# Patient Record
Sex: MALE | Race: BLACK OR AFRICAN AMERICAN | NOT HISPANIC OR LATINO | Employment: OTHER | URBAN - METROPOLITAN AREA
[De-identification: names, ages, dates, MRNs, and addresses within clinical notes are randomized per-mention and may not be internally consistent; named-entity substitution may affect disease eponyms.]

---

## 2017-01-23 ENCOUNTER — LAB VISIT (OUTPATIENT)
Dept: LAB | Facility: HOSPITAL | Age: 47
End: 2017-01-23
Payer: MEDICARE

## 2017-01-23 DIAGNOSIS — Z76.82 ORGAN TRANSPLANT CANDIDATE: ICD-10-CM

## 2017-01-23 PROCEDURE — 86832 HLA CLASS I HIGH DEFIN QUAL: CPT | Mod: PO

## 2017-01-23 PROCEDURE — 86829 HLA CLASS I/II ANTIBODY QUAL: CPT | Mod: 91,PO

## 2017-01-23 PROCEDURE — 86829 HLA CLASS I/II ANTIBODY QUAL: CPT | Mod: PO

## 2017-01-31 LAB — HPRA INTERPRETATION: NORMAL

## 2017-02-06 DIAGNOSIS — Z76.82 ORGAN TRANSPLANT CANDIDATE: Primary | ICD-10-CM

## 2017-02-06 NOTE — LETTER
Date: 2017    To:  Dialysis Social Worker From:   Coby Salazar, [facility]    Madai Kearns, [facility] Bullock, [facility]    Mary Guzman, [facility] Tomas [facility]         Thank you in advance for collaborating with us.  If you have any questions, please call us at [phone].  Your patient, Juan Antonio Gandhi DOB 1970, is currently listed for transplant and is in need of an updated adherence assessment to maintain their listing for transplant.    Please provide the following information by completing and faxing this form back to us at [phone].    In the last 3 months:      Number of AMAs with dates: _________________________________________________________________     _________________________________________________________________________________________       Number of No-Show appointments with dates: ___________________________________________________      _________________________________________________________________________________________    Last intact PTH:  ____       Any concerns with Labs:  yes or no              If yes, please explain:  _______________________________________________________________________     _________________________________________________________________________________________     Any concerns with Caregivers:  yes or no    If yes, please explain:  _______________________________________________________________________    _________________________________________________________________________________________     Any concerns with Transportations:  yes or no    If yes, please explain:  _______________________________________________________________________    _________________________________________________________________________________________    Any psychiatric and/or psychosocial concerns:  yes or no    If yes, please explain:  _______________________________________________________________________           __________________________________________________________________________________________       [address]  ?  [place] [address]  ?  phone [phone]  ?  fax [phone]  ?  [email or web address]    Confidentiality notice: The accompanying facsimile is intended solely for the use of the recipient designated above. Document(s) transmitted herewith may contain information that is confidential and privileged. Delivery, distribution or dissemination of this communication other than to the intended recipient is strictly prohibited. If you have received this facsimile in error, please notify us immediately by telephone.

## 2017-02-07 NOTE — PROGRESS NOTES
YEARLY LIST MANAGEMENT NOTE    Juan Antonio Gandhi's kidney transplant listing status reviewed.  Patient is due for follow-up appointments in March 2017.  Appointments will be scheduled per protocol.  HLA sample is current and being rec'd on a regular basis.

## 2017-02-08 LAB
CLASS I ANTIBODY COMMENTS - LUMINEX: NORMAL
CPRA %: 0
SERUM COLLECTION DT - LUMINEX CLASS I: NORMAL
SPCL1 TESTING DATE: NORMAL

## 2017-02-16 ENCOUNTER — TELEPHONE (OUTPATIENT)
Dept: TRANSPLANT | Facility: CLINIC | Age: 47
End: 2017-02-16

## 2017-02-16 NOTE — TELEPHONE ENCOUNTER
SW received adherence fax from pt's dialysis unit.  According to dialysis unit medical record, in the last three months pt has, 0 AMAs, 0  No Shows and denies issues with transportation and labs. Pt's PTH is 296.      Confirmed by dialysis unit via fax 2/6/2017

## 2017-02-17 ENCOUNTER — LAB VISIT (OUTPATIENT)
Dept: LAB | Facility: HOSPITAL | Age: 47
End: 2017-02-17
Payer: MEDICARE

## 2017-02-17 DIAGNOSIS — Z76.82 ORGAN TRANSPLANT CANDIDATE: ICD-10-CM

## 2017-02-17 PROCEDURE — 86829 HLA CLASS I/II ANTIBODY QUAL: CPT | Mod: 91,PO

## 2017-02-17 PROCEDURE — 86829 HLA CLASS I/II ANTIBODY QUAL: CPT | Mod: PO

## 2017-03-03 LAB — HPRA INTERPRETATION: NORMAL

## 2017-05-11 ENCOUNTER — HOSPITAL ENCOUNTER (OUTPATIENT)
Dept: RADIOLOGY | Facility: HOSPITAL | Age: 47
Discharge: HOME | End: 2017-05-11
Attending: INTERNAL MEDICINE | Admitting: INTERNAL MEDICINE
Payer: MEDICARE

## 2017-05-11 ENCOUNTER — OFFICE VISIT (OUTPATIENT)
Dept: TRANSPLANT | Facility: CLINIC | Age: 47
End: 2017-05-11
Payer: MEDICARE

## 2017-05-11 ENCOUNTER — HOSPITAL ENCOUNTER (OUTPATIENT)
Dept: CARDIOLOGY | Facility: CLINIC | Age: 47
Discharge: HOME | End: 2017-05-11
Payer: MEDICARE

## 2017-05-11 ENCOUNTER — DOCUMENTATION ONLY (OUTPATIENT)
Dept: CARDIOLOGY | Facility: CLINIC | Age: 47
End: 2017-05-11

## 2017-05-11 ENCOUNTER — HOSPITAL ENCOUNTER (OUTPATIENT)
Dept: RADIOLOGY | Facility: HOSPITAL | Age: 47
Discharge: HOME | End: 2017-05-11
Attending: INTERNAL MEDICINE
Payer: MEDICARE

## 2017-05-11 VITALS
TEMPERATURE: 98 F | RESPIRATION RATE: 16 BRPM | BODY MASS INDEX: 29.51 KG/M2 | OXYGEN SATURATION: 100 % | SYSTOLIC BLOOD PRESSURE: 138 MMHG | WEIGHT: 194.69 LBS | HEART RATE: 55 BPM | HEIGHT: 68 IN | DIASTOLIC BLOOD PRESSURE: 71 MMHG

## 2017-05-11 DIAGNOSIS — N18.9 ANEMIA IN CHRONIC KIDNEY DISEASE (CKD): ICD-10-CM

## 2017-05-11 DIAGNOSIS — Z76.82 ORGAN TRANSPLANT CANDIDATE: ICD-10-CM

## 2017-05-11 DIAGNOSIS — N25.81 HYPERPARATHYROIDISM, SECONDARY RENAL: ICD-10-CM

## 2017-05-11 DIAGNOSIS — D63.1 ANEMIA IN CHRONIC KIDNEY DISEASE (CKD): ICD-10-CM

## 2017-05-11 DIAGNOSIS — N18.6 ESRD (END STAGE RENAL DISEASE) ON DIALYSIS: ICD-10-CM

## 2017-05-11 DIAGNOSIS — I36.9 NONRHEUMATIC TRICUSPID VALVE DISORDER: ICD-10-CM

## 2017-05-11 DIAGNOSIS — Z99.2 ESRD (END STAGE RENAL DISEASE) ON DIALYSIS: ICD-10-CM

## 2017-05-11 DIAGNOSIS — Z76.82 ORGAN TRANSPLANT CANDIDATE: Primary | ICD-10-CM

## 2017-05-11 LAB
DIASTOLIC DYSFUNCTION: NO
ESTIMATED PA SYSTOLIC PRESSURE: 27.4
RETIRED EF AND QEF - SEE NOTES: 60 (ref 55–65)
TRICUSPID VALVE REGURGITATION: NORMAL

## 2017-05-11 PROCEDURE — 93351 STRESS TTE COMPLETE: CPT | Mod: 26,S$PBB,TXP, | Performed by: INTERNAL MEDICINE

## 2017-05-11 PROCEDURE — 93325 DOPPLER ECHO COLOR FLOW MAPG: CPT | Mod: PBBFAC,TXP | Performed by: INTERNAL MEDICINE

## 2017-05-11 PROCEDURE — 76770 US EXAM ABDO BACK WALL COMP: CPT | Mod: 26,TXP,, | Performed by: RADIOLOGY

## 2017-05-11 PROCEDURE — 99213 OFFICE O/P EST LOW 20 MIN: CPT | Mod: PBBFAC,25,TXP | Performed by: INTERNAL MEDICINE

## 2017-05-11 PROCEDURE — 99999 PR PBB SHADOW E&M-EST. PATIENT-LVL III: CPT | Mod: PBBFAC,TXP,, | Performed by: INTERNAL MEDICINE

## 2017-05-11 PROCEDURE — 99214 OFFICE O/P EST MOD 30 MIN: CPT | Mod: S$PBB,TXP,, | Performed by: INTERNAL MEDICINE

## 2017-05-11 PROCEDURE — 71020 XR CHEST PA AND LATERAL: CPT | Mod: 26,TXP,, | Performed by: RADIOLOGY

## 2017-05-11 PROCEDURE — 93320 DOPPLER ECHO COMPLETE: CPT | Mod: 26,S$PBB,TXP, | Performed by: INTERNAL MEDICINE

## 2017-05-11 RX ORDER — LISINOPRIL 20 MG/1
10 TABLET ORAL DAILY
COMMUNITY
Start: 2017-02-15 | End: 2018-04-17

## 2017-05-11 NOTE — PROGRESS NOTES
KIDNEY, KIDNEY/PANCREAS, PANCREAS TRANSPLANT RECIPIENT REEVALUATION    Requesting Physician: No ref. provider found    SUBJECTIVE     CC:   Six monthly/Annual reassessment of kidney transplant candidacy.    HPI:  Mr. Gandhi is a 46 y.o. year old Black or  male with ESRD secondary to HTN. He has been on the wait list for a kidney transplant at [facility] since 3/5/2014. Patient is currently on hemodialysis started on Mar 2014. Patient is dialyzing on MWF schedule for 4 hours. His dry weigh 87.5 kg.  Patient reports that he is tolerating dialysis well. He has a LUE AV fistula.  He makes some urine 3 x daily.  Patient denies any recent ER visit, hospitalization or recent history of coronary artery disease, stroke, seizure disorder, chronic obstructive pulmonary disease, liver disease, kidney stones, gallstones, deep venous thrombosis, pulmonary embolism, or malignancies. Patient states that he is doing well. he denies any CP, SOB,  nausea, vomiting, diarrhea, abdominal pain, melena, cough, fever, chills, weight loss or night sweats.  he has no focal weakness, sensory loss, numbness or paresthesias.     Functional Capacity Status: he is active, walks frequently and denies any chest pain, shortness of breath or lower extremity pain on mild to moderate activity.  Previous Transplant: no  Previous Blood Transfusion: no  Potential Donor: no  Anticoagulation: no      Recent Work UP  CXR: clear  Echo: unremarkable  Cardiac NM: unremarkable  Kindney US: unremarkable  PSA: 1.3 stable  Colonoscopy: N/A      Past Medical History:   Diagnosis Date    Allergy     Benign hypertension with ESRD (end-stage renal disease) 11/4/2014    Blood type A+ 11/4/2014    ESRD (end stage renal disease) on dialysis - 03/05/2014 11/4/2014    Hyperparathyroidism, secondary renal 11/4/2014    Organ transplant candidate 11/4/2014       Past Surgical History:  Past Surgical History:   Procedure Laterality Date    AV FISTULA PLACEMENT       MYLES    RENAL BIOPSY           Family History:  Family History   Problem Relation Age of Onset    Hypertension Sister      couple of sisters with HTN    Stroke Maternal Uncle     Heart disease Maternal Uncle     Kidney disease Neg Hx     Diabetes Neg Hx        Social History:  Social History     Social History    Marital status: Single     Spouse name: N/A    Number of children: 1    Years of education: N/A     Occupational History     Disabled     Social History Main Topics    Smoking status: Never Smoker    Smokeless tobacco: Never Used    Alcohol use No    Drug use: No    Sexual activity: Not on file     Other Topics Concern    Not on file     Social History Narrative    Disabled            No blood transfusions    No donors           Current Medication  Current Outpatient Prescriptions   Medication Sig Dispense Refill    calcium acetate (PHOSLO) 667 mg capsule Take 667 mg by mouth 3 (three) times daily with meals.      fluticasone (FLONASE) 50 mcg/actuation nasal spray 1 spray by Each Nare route once daily.      lisinopril (PRINIVIL,ZESTRIL) 20 MG tablet Take 20 mg by mouth.      montelukast (SINGULAIR) 5 MG chewable tablet Take 5 mg by mouth Daily.      sevelamer carbonate (RENVELA) 800 mg Tab Take 2,400 mg by mouth 3 (three) times daily with meals.       No current facility-administered medications for this visit.          Review of Systems  Constitutional: Negative for unexpected weight change.  + fatigue,   Eyes: Negative. Negative for visual disturbance.   Respiratory: Negative for shortness of breath.   Cardiovascular: Negative for chest pain.   Gastrointestinal: Negative for abdominal pain.   Genitourinary: Negative for difficulty urinating.   Musculoskeletal: Negative for arthralgias and gait problem.   Skin: Negative for rash.   Hematological: Does not bruise/bleed easily.     OBJECTIVE       Body mass index is 29.6 kg/(m^2).    Vitals:    05/11/17 1245    BP: 138/71   Pulse: (!) 55   Resp: 16   Temp: 98 °F (36.7 °C)       Physical Exam     General: No acute distress, well groomed  HEENT: Normocephalic, atraumatic, PERRLA, moist mucous membranes, no oral ulcerations/lesions   Neck: Supple, symmetrical, trachea midline, no masses, thyroid is normal without nodules or enlargement   Respiratory: Clear to auscultation bilaterally, respirations unlabored, no rales  Cardiovacular: Regular rate and rhythm, S1, S2 normal, no murmurs, no rubs or gallops, no carotid bruits, no JVD,   Gastrointestinal: Soft, non-tender, bowel sounds normal  Extremities: No clubbing or cyanosis of upper extremities bilaterally, no pedal edema bilaterally  Skin: warm and dry; no rash on exposed skin  Lymph nodes: Cervical and supraclavicular nodes normal   Neurologic: No focal neurologic deficits. alert and oriented x 3   Musculoskeletal: moves all extremities without difficulty, FROM, 5/5 strength, ambulates without an assistive device  Psychiatric: Normal mood and affect. Responds appropriately to questions.    Labs:  Reviewed with the patient      ASSESSMENT     1. Organ transplant candidate     2. Hyperparathyroidism, secondary renal     3. Anemia in chronic kidney disease (CKD)     4. ESRD (end stage renal disease) on dialysis - 03/05/2014           PLAN       Transplant Candidacy:    Mr. Gandhi is a a suitable for kidney transplantation. He remains in overall stable health, and will remain active on the transplant list.     Follow up:  In addition to the tests mentioned above, the patient will continue to have reevaluation as per the standing pre-kidney transplant protocol:   1. Monthly blood for PRA   2. Annual return to Clinic, except HIV Positive, > 65 years of age, or pancreas transplant candidates who will be scheduled to see transplant every 6 months while in pre-transpalnt phase.   3. Annual re-testing: CXR, EKG, mammograms for women over 40 and PSA for males over 40. cardiology  follow-up as recommended by initial cardiology pre-transplant evaluation.   4. Renal Ultrasound every 2 years.   5. Baseline colonoscopy after age 50 and repeated as recommended.         Counselling:  We discussed various aspects of kidney transplantation including transplant surgery, immunosuppressive medications and the need for close follow up. We also discussed side effects of immunosuppression including weight gain, hypertension, hyperlipidemia, new-onset diabetes after transplantation, infections and malignancies, especially skin cancers and lymphomas. I also reviewed the risk of acute rejection, vascular thrombosis, recurrent disease and potential transmission of infections such as hepatitis and HIV. I informed the patient that the average time on the wait list in the [facility] is between 3 to 5 years.

## 2017-05-11 NOTE — MR AVS SNAPSHOT
[facility]  [address]  [facility] [address]  Phone: [phone]                  Juan Antonio Gandhi   2017 12:30 PM   Office Visit    Description:  Male : 1970   Provider:  Nelia Manzanares MD   Department:  [facility]           Diagnoses this Visit        Comments    Organ transplant candidate    -  Primary     Hyperparathyroidism, secondary renal         Anemia in chronic kidney disease (CKD)         ESRD (end stage renal disease) on dialysis                To Do List           Goals (5 Years of Data)     None      [facility]     [facility] On Call Nurse Care Line -  Assistance  Unless otherwise directed by your provider, please contact [facility], our nurse care line that is available for  assistance.     Registered nurses in the [facility] provide: appointment scheduling, clinical advisement, health education, and other advisory services.  Call: [phone] (toll free)               Medications           Message regarding Medications     Verify the changes and/or additions to your medication regime listed below are the same as discussed with your clinician today.  If any of these changes or additions are incorrect, please notify your healthcare provider.        STOP taking these medications     amlodipine (NORVASC) 10 MG tablet Take 10 mg by mouth once daily.    hydrALAZINE (APRESOLINE) 50 MG tablet Take 50 mg by mouth every 12 (twelve) hours.    lisinopril-hydrochlorothiazide (PRINZIDE,ZESTORETIC) 20-12.5 mg per tablet Take 1 tablet by mouth once daily.           Verify that the below list of medications is an accurate representation of the medications you are currently taking.  If none reported, the list may be blank. If incorrect, please contact your healthcare provider. Carry this list with you in case of emergency.           Current Medications     calcium acetate (PHOSLO) 667 mg capsule Take 667 mg by mouth 3 (three) times daily  "with meals.    fluticasone (FLONASE) 50 mcg/actuation nasal spray 1 spray by Each Nare route once daily.    lisinopril (PRINIVIL,ZESTRIL) 20 MG tablet Take 20 mg by mouth.    montelukast (SINGULAIR) 5 MG chewable tablet Take 5 mg by mouth Daily.    sevelamer carbonate (RENVELA) 800 mg Tab Take 2,400 mg by mouth 3 (three) times daily with meals.           Clinical Reference Information           Your Vitals Were     BP Pulse Temp Resp Height Weight    138/71 (BP Location: Right arm, Patient Position: Sitting, BP Method: Automatic) 55 98 °F (36.7 °C) (Oral) 16 5' 8" (1.727 m) 88.3 kg (194 lb 10.7 oz)    SpO2 BMI             100% 29.6 kg/m2         Blood Pressure          Most Recent Value    BP  138/71      Allergies as of 5/11/2017     No Known Allergies      Immunizations Administered on Date of Encounter - 5/11/2017     None      Maintenance Dialysis History     Start End Type Comments Center    3/5/2014  [place]  [facility]            Current Dialysis Center Information     [facility] [address] [facility] [address] Phone #:  [phone]    Contact:  N/[place]  [address] Fax #:  [phone]            Transplant Information        Txp Date Organ Coordinator Care Team     Kidney Nicola Lin Jr., RN Referring Physician:  Mima Clemente MD   Current Nephrologist:  MD Sheridan Halljustin Sign-Up     Activating your [facility] account is as easy as 1-2-3!     1) Visit [email or web address], select Sign Up Now, enter this activation code and your date of birth, then select Next.  [email or web address]  Expires: 6/25/2017  2:45 PM      2) Create a username and password to use when you visit [facility] in the future and select a security question in case you lose your password and select Next.    3) Enter your e-mail address and click Sign Up!    Additional Information  If you have questions, please e-mail [email or web address] or call [phone] to talk to our [facility] staff. Remember, [facility] " is NOT to be used for urgent needs. For medical emergencies, dial 911.         Language Assistance Services     ATTENTION: Language assistance services are available, free of charge. Please call [phone].      ATENCIÓN: Si suzanne alcaraz, tiene a mckeon disposición servicios gratuitos de asistencia lingüística. Llame al [phone].     [facility] Ý: N?u b?n nói Ti?ng Vi?t, có các d?ch v? h? tr? ngôn ng? mi?n phí dành cho b?n. G?i s? [phone].         [facility] complies with applicable Federal civil rights laws and does not discriminate on the basis of race, color, national origin, age, disability, or sex.

## 2017-05-11 NOTE — PROGRESS NOTES
Patient was seen in listed 'round opal' transplant clinic for continued evaluation for kidney, kidney/pancreas or pancreas only transplant. The patient attended a group education session that discussed/reviewed the following aspects of transplantation: evaluation and selection committee process, UNOS waitlist management/multiple listings, types of organs offered (KDPI < 85%, KDPI > 85%, PHS increased risk, DCD), financial aspects, surgical procedures, dietary instruction pre- and post-transplant, health maintenance pre- and post-transplant, post-transplant hospitalization and outpatient follow-up, potential to participate in a research protocol, and medication management and side effects. A question and answer session was provided after the presentation.     The patient was seen by all members of the multi-disciplinary team to include: Nephrologist/PA, , Transplant Coordinator, and .      The patient reviewed and signed all consents for evaluation which were witnessed and sent to scanning into the EPIC chart.     The patient was given an education book and plan for further evaluation based on her individual assessment.      The patient was encouraged to call with any questions or concerns.

## 2017-05-11 NOTE — PROGRESS NOTES
Patient verified by 2 identifiers and allergies reviewed.  20g IV placed to Rt FA.  DSE explained to patient, consent obtained & testing completed.  Pt tolerated testing well. IV removed post testing.  Post study discharge instructions reviewed with patient, patient verbalized understanding.  Patient discharged to home in stable condition.

## 2017-05-11 NOTE — LETTER
May 11, 2017        Mima Clemente  [address] [facility] [place] [address]  Phone: [phone]  Fax: [phone]             [facility]  [address]  [facility] [address]  Phone: [phone]   Patient: Juan Antonio Gandhi   MR Number: [id number]   YOB: 1970   Date of Visit: 5/11/2017       Dear Dr. Mima Clemente    Thank you for referring Juan Antonio Gandhi to me for evaluation. Attached you will find relevant portions of my assessment and plan of care.    If you have questions, please do not hesitate to call me. I look forward to following Juan Antonio Gandhi along with you.    Sincerely,    Nelia Manzanares MD    Enclosure    If you would like to receive this communication electronically, please contact [email or web address] or [phone] to request [employer] access.    [employer] is a tool which provides read-only access to select patient information with whom you have a relationship. Its easy to use and provides real time access to review your patients record including encounter summaries, notes, results, and demographic information.    If you feel you have received this communication in error or would no longer like to receive these types of communications, please e-mail [email or web address]

## 2017-05-18 NOTE — PROGRESS NOTES
Transplant Recipient Adult Psychosocial Assessment    Juan Antonio Gandhi  [address]  [place] [address]    Telephone Information:   Mobile [phone]   Home  [phone] (home)  Work  There is no work phone number on file.  E-mail  [email or web address]    Sex: male  YOB: 1970  Age: 46 y.o.    Encounter Date: 5/11/2017  [place] Citizen: yes  Primary Language: English   Needed: no    Emergency Contact:  Name: Ananya Gandhi  Relationship: mother  Address: [address] [place] [address] [place] [address]  Phone Numbers:  [phone] (home), [phone] (mobile)    Family/Social Support:   Number of dependents/: Pt reports no dependents.  Marital history: Pt reports 2 marriages with both marriage ending in divorce. Pt was present with pt's most recent ex-wife, Kaylin John.   Other family dynamics: Pt presents with s/o Desiree. Pt resides with mother. Pt reports 1 adult son: Juan Antonio Dumont; 4 sisters: Jemma, Casey, Jessika, and Roma; mom: Ananya Joshi. Pt identifies all family members as supportive.    Household Composition:  Name: Juan Antonio Gandhi  Age: 46  Relationship: patient  Does person drive? yes    Name: Ananya Gandhi  Age: 75  Relationship: mother  Does person drive? yes    Do you and your caregivers have access to reliable transportation? yes  PRIMARY CAREGIVER: Desiree Jimenez will be primary caregiver, phone number [phone]. RONNY inquired if  Kelsy will act as pt's primary caregiver, Kelsy reports yes with hesitation. SW inquired if she was aware of caregiver role and she reports she was not. SW again asked if Desiree would be pt's primary caregiver. Desiree reports she will and SW continued to provide caregiver education. Caregiver states understanding all aspects of caregiver role/commitment.  Caregiver states is able/willing/committed to being caregiver to the fullest extent necessary.  provided in-depth information to patient and caregiver regarding pre- and post-transplant  caregiver role.  Patient and caregiver verbalizes understanding of the education provided today.   strongly encourages patient and caregiver to have concrete plan regarding post-transplant care giving, including back-up caregiver(s) to ensure care giving needs are met as needed.  Patient and caregiver verbalizes understanding of caregiver responsibilities.   remains available. Patient and caregiver agree to contact  in a timely manner if concerns arise.  Able to take time off work without financial concerns: yes.     Additional Significant Others who will Assist with Transplant:  Name: Ananya Gandhi  Age: 74  City: [place] State: [place]  Relationship: mother  Does person drive? yes      Living Will: no Education and information provided to pt.  Healthcare Power of : no Education and information provided to pt.  Advance Directives on file: <<no information> per medical record.  Verbally reviewed LW/HCPA information.   provided patient with copy of LW/HCPA documents and provided education on completion of forms    Living Donors: No. Education and resource information given to patient.    Highest Education Level: High School (9-12) or GED  Reading Ability: college  Reports difficulty with: Pt reports no difficulites.  Learns Best By:  Pt reports pt learns best by a combination of verbal and written instructions.     Status: no  VA Benefits: no     Working for Income: No  If no, reason not working: Disability  Spouse/Significant Other Employment: s/o did not reports and did state she will be able to take time from work to care for pt.     Disabled: yes: date disability began: 7/01/2014, due to: ESRD.    Monthly Income:   Disability: $911  Food [place]: $30  Able to afford all costs now and if transplanted, including medications: yes. Pt reports that he can also receive assistance from family members. Pt reports strong motivation to return to work and SW  provided vocational rehabilitation resources   Patient verbalizes understanding of personal responsibilities related to transplant costs and the importance of having a financial plan to ensure that patients transplant costs are fully covered.   provided fundraising information/education.  Patient verbalizes understanding.   remains available.    Insurance:   Payor/Plan Subscr  Sex Relation Sub. Ins. ID Effective Group Num   1. MEDICARE - ME* LIVIER JAMES 1970 Male  [id number]                                    [address]   2. GENERIC OUT O* LIVIER JAMES 1970 Male  [id number]                                    [address], [place] [address]     Secondary Insurance (for UNOS reporting): Public Insurance - Medicaid. Pt reports also having Cigna Part D.  Patient verbalizes clear understanding that patient may experience difficulty obtaining and/or be denied insurance coverage post-surgery. This includes and is not limited to disability insurance, life insurance, health insurance, burial insurance, long term care insurance, and other insurances.  Patient also reports understanding that future health concerns related to or unrelated to transplantation may not be covered by patient's insurance.  Resources and information provided and reviewed.  Pt verbalized understanding of Medicare and Medicaid coverage and difficulty of obtaining other insurances post transplant.    Patient provides verbal permission to release any necessary information to outside resources for patient care and discharge planning.  Resources and information provided are reviewed.      Dialysis History:  Type: hemodialysis in center  Schedule: MW p.m.  Access: MYLES: fistula  Unit: [facility]  [address] [place] [address]  Phone: [phone]  Fax: [phone]  MD Name: Dr. Mima Clemente MD Phone: [phone]  Dialysis Adherence: According to dialysis unit medical  record, in the last three months pt has, 0 AMAs, 0  No Shows and denies issues with transportation and labs.   Confirmed by dialysis unit-JOHAN Cleaning    Infusion Service: patient utilizing? no  Home Health: patient utilizing? no  DME: no  Pulmonary/Cardiac Rehab: Pt denies.   ADLS:  Pt reports no difficulties with driving, walking, bathing, cooking, housekeeping, eating, shopping, and taking medication.    Adherence:   Pt reports good adherence with medications, dialysis, and health regimen.  Adherence education and counseling provided.     Per History Section:  Past Medical History:   Diagnosis Date    Allergy     Benign hypertension with ESRD (end-stage renal disease) 11/4/2014    Blood type A+ 11/4/2014    ESRD (end stage renal disease) on dialysis - 03/05/2014 11/4/2014    Hyperparathyroidism, secondary renal 11/4/2014    Organ transplant candidate 11/4/2014     Social History   Substance Use Topics    Smoking status: Never Smoker    Smokeless tobacco: Never Used    Alcohol use No     History   Drug Use No     History   Sexual Activity    Sexual activity: Not on file       Per Today's Psychosocial:  Tobacco: none, patient denies any use.  Alcohol: none, patient denies any use.  Illicit Drugs/Non-prescribed Medications: none, patient denies any use.    Patient states clear understanding of the potential impact of substance use as it relates to transplant candidacy and is aware of possible random substance screening.  Substance abstinence/cessation counseling, education and resources provided and reviewed.     Arrests: Pt reports being arrested in 2005 for possession of an illegal substance.   DWI/Treatment/Rehab: patient denies    Psychiatric History:    Mental Health: Pt reports no history of or current mental health issues or concerns.   Psychiatrist/Counselor: Pt denies seeing a mental professional.  Medications:  Pt denies taking medications for mental health reasons.  Suicide/Homicide Issues: Pt  denies any history of or current suicidal or homicidal ideations.      Knowledge: Patient states having clear understanding and realistic expectations regarding the potential risks and potential benefits of organ transplantation and organ donation and agrees to discuss with health care team members and support system members, as well as to utilize available resources and express questions and/or concerns in order to further facilitate the pt informed decision-making.  Resources and information provided and reviewed. Pt reports having a good understanding of the transplant process from information obtained through appointments with the transplant team.    Patient is aware of [facility]'s affiliation and/or partnership with agencies in home health care, LTAC, SNF, [facility], and other hospitals and clinics.    Understanding: Patient reports having a clear understanding of the many lifetime commitments involved with being a transplant recipient, including costs, compliance, medications, lab work, procedures, appointments, concrete and financial planning, preparedness, timely and appropriate communication of concerns, abstinence (ETOH, tobacco, illicit non-prescribed drugs), adherence to all health care team recommendations, support system and caregiver involvement, appropriate and timely resource utilization and follow-through, mental health counseling as needed/recommended, and patient and caregiver responsibilities.  Social Service Handbook, resources and detailed educational information provided and reviewed.  Educational information provided.    Patient also reports current and expected compliance with health care regime, and patient states having a clear understanding of the importance of compliance.  Patient reports a clear understanding that risks and benefits may be involved with organ transplantation and with organ donation.  Patient also reports clear understanding that psychosocial risk factors may affect patient,  and include but are not limited to feelings of depression, generalized anxiety, anxiety regarding dependence on others, post traumatic stress disorder, feelings of guilt and other emotional and/or mental concerns, and/or exacerbation of existing mental health concerns.  Detailed resources provided and discussed.  Patient agrees to access appropriate resources in a timely manner as needed and/or as recommended, and to communicate concerns appropriately.  Patient also reports a clear understanding of treatment options available.  Pt and pt's caregiver verbalized understanding of the expectations of the transplant process. SW educated pt on mental health concerns as it pertains to the transplant process. Pt reports being open to seeing psych for talk therapy if necessary and agrees to contact the transplant team if the process becomes too overwhelming. SW provided education and emotional support regarding the transplant process. SW remains available.    Patient and caregiver received education in a group setting.   reviewed education, provided additional information, and answered questions.    Feelings or Concerns: Pt and pt's caregiver did not express any concerns at this time.     Coping: Pt reports coping well with the transplant process at this time and reports exercising as a way to cope. [facility] in [place], [place] with Maribell Morales presiding.     Goals: Pt reports going back to work, being successful, and regaining a full quality of life as goals for after transplant.  Patient referred to Vocational Rehabilitation.    Interview Behavior: Patient presents as alert and oriented x 4, pleasant, good eye contact, well groomed, recall good, concentration/judgement good, average intelligence, calm, communicative, cooperative and asking and answering questions appropriately.  Pt presents with s/o Desiree with permission.  Pt was highly engaged and reports high motivation for  transplant and returning to work post transplant.          Transplant Social Work - Candidacy  Assessment/Plan:     Psychosocial Suitability: Patient presents as a suitable candidate for kidney transplant at this time. Based on psychosocial risk factors, patient presents as low risk, due to aduquate insurnce, suitable caregiver plan, and strong dialysis adherence..    Recommendations/Additional Comments: SW recommends that pt conduct fundraising to assist pt with pay for cost of medications, food, gas, and other transplant related needs. SW recommends that pt remain aware of potential mental health concerns and contact the team if any concerns arise. SW recommends that pt remain abstinent from tobacco, ETOH, and drug use. SW supports pt's continued dialysis adherence. SW remains available to answer any questions or concerns that arise as the pt moves through the transplant process.         FAINA Moreno

## 2017-05-26 ENCOUNTER — LAB VISIT (OUTPATIENT)
Dept: LAB | Facility: HOSPITAL | Age: 47
End: 2017-05-26
Payer: MEDICARE

## 2017-05-26 DIAGNOSIS — Z76.82 ORGAN TRANSPLANT CANDIDATE: ICD-10-CM

## 2017-06-23 ENCOUNTER — LAB VISIT (OUTPATIENT)
Dept: LAB | Facility: HOSPITAL | Age: 47
End: 2017-06-23
Payer: MEDICARE

## 2017-06-23 DIAGNOSIS — Z76.82 ORGAN TRANSPLANT CANDIDATE: ICD-10-CM

## 2017-07-08 LAB — HPRA INTERPRETATION: NORMAL

## 2017-07-08 PROCEDURE — 86829 HLA CLASS I/II ANTIBODY QUAL: CPT | Mod: PO,TXP

## 2017-07-08 PROCEDURE — 86829 HLA CLASS I/II ANTIBODY QUAL: CPT | Mod: 91,PO,TXP

## 2017-07-11 NOTE — LETTER
IMPORTANT      July 13, 2017    Juan Antonio Gandhi  [address] [place] [id number]     Re: [id number]    Dear Mr/Mrs Gandhi,    Thank you for choosing [facility] as your health care provider.  We are committed to assisting you with timely insurance filing and payment of your account.  To protect your liability, updated insurance information must be given to us at the time of service and we should be notified immediately if      · Your insurance benefits/plan changes.   You become eligible for any other benefits   Your current plan/coverage terms.    Also, please bring in a copy of your insurance premium payment if you have one of the following types of insurance:    · Coverage from a snf plan.  · Coverage from the Affordable Healthcare Act Plan.  · Coverage from a COBRA plan  · Premium paid by the National Kid[employer] Foundation.    To ensure we have the correct insurance (Medical/Pharmacy) on file and to answer any questions regarding your benefits, please call us at [phone] or [phone] and ask to speak to the kidney  indicated below:      Transplant Dept  [facility]   Heart   Parul Ny   Kidney (A-K) and Lung  Robertocarmen Tavo    Kidney (L-Z)  Hailee Arce   Liver    We look forward to hearing from you soon.    Sincerely,      Transplant Financial Services

## 2017-07-11 NOTE — Clinical Note
July 11, 2017        Juan Antonio Gandhi  [address] [address]         Dear Juan Antonio Gandhi:    As part of our commitment to share important information with our transplant patients, we want to provide you with the most current kidney and kidney/pancreas transplant outcomes at the [facility] as published bi-annually by the Scientific Registry for Organ Recipients (SRTR).    For kidney transplants performed between 1/1/2014 and 6/30/2016 the 1-year patient and graft survival rates are as follows:   Kidney-Cadaveric Donor Kidney-Living Donor Kidney/Pancreas   Adults [facility] 1-Year Expected 1-Year National 1-Year [facility] 1-Year Expected 1-Year National 1-Year [facility] 1-Year Expected 1-Year National 1-Year   Graft Survival 93.09% 94.31% 93.92% 97.83% 97.54% 97.75% 98.18% 96.73% 96.15%   Patient Survival 96.56% 96.91% 96.57% 100% 98.88% 98.83% 98.18% 97.52% 97.54%         To learn more about transplant outcomes in the [place] you can go to www.srtr.org.     Please call the Kidney Transplant Office at [phone] or [phone] should you have any questions or if:     Your insurance changes in any way   Your address or phone number changes   There are changes in your health   You are in the hospital or Emergency Room for any reason      Sincerely,  Kidney Transplant Team

## 2017-07-13 ENCOUNTER — LAB VISIT (OUTPATIENT)
Dept: LAB | Facility: HOSPITAL | Age: 47
End: 2017-07-13
Payer: MEDICARE

## 2017-07-13 DIAGNOSIS — Z76.82 ORGAN TRANSPLANT CANDIDATE: ICD-10-CM

## 2017-07-13 PROCEDURE — 86829 HLA CLASS I/II ANTIBODY QUAL: CPT | Mod: 91,PO,TXP

## 2017-07-13 PROCEDURE — 86829 HLA CLASS I/II ANTIBODY QUAL: CPT | Mod: PO,TXP

## 2017-07-13 NOTE — ADDENDUM NOTE
Encounter addended by: Kaylee Willis on: 7/13/2017  3:34 PM<BR>    Actions taken: Letter status changed

## 2017-07-31 LAB — HPRA INTERPRETATION: NORMAL

## 2017-07-31 PROCEDURE — 86832 HLA CLASS I HIGH DEFIN QUAL: CPT | Mod: PO,TXP

## 2017-07-31 PROCEDURE — 86833 HLA CLASS II HIGH DEFIN QUAL: CPT | Mod: PO,TXP

## 2017-08-01 DIAGNOSIS — Z76.82 ORGAN TRANSPLANT CANDIDATE: ICD-10-CM

## 2017-08-07 LAB
CLASS I ANTIBODIES - LUMINEX: NEGATIVE
CLASS II ANTIBODIES - LUMINEX: NORMAL
CLASS II ANTIBODY COMMENTS - LUMINEX: NORMAL
CPRA %: 0
SERUM COLLECTION DT - LUMINEX CLASS I: NORMAL
SERUM COLLECTION DT - LUMINEX CLASS II: NORMAL
SPCL1 TESTING DATE: NORMAL
SPCL2 TESTING DATE: NORMAL
SPCLU TESTING DATE: NORMAL

## 2017-08-09 LAB — HPRA INTERPRETATION: NORMAL

## 2017-08-23 ENCOUNTER — LAB VISIT (OUTPATIENT)
Dept: LAB | Facility: HOSPITAL | Age: 47
End: 2017-08-23
Payer: MEDICARE

## 2017-08-23 DIAGNOSIS — Z76.82 ORGAN TRANSPLANT CANDIDATE: ICD-10-CM

## 2017-08-23 PROCEDURE — 86829 HLA CLASS I/II ANTIBODY QUAL: CPT | Mod: PO,TXP

## 2017-08-23 PROCEDURE — 86829 HLA CLASS I/II ANTIBODY QUAL: CPT | Mod: 91,PO,TXP

## 2017-09-14 LAB — HPRA INTERPRETATION: NORMAL

## 2017-09-21 ENCOUNTER — LAB VISIT (OUTPATIENT)
Dept: LAB | Facility: HOSPITAL | Age: 47
End: 2017-09-21
Payer: MEDICARE

## 2017-09-21 DIAGNOSIS — Z76.82 ORGAN TRANSPLANT CANDIDATE: ICD-10-CM

## 2017-09-21 PROCEDURE — 86833 HLA CLASS II HIGH DEFIN QUAL: CPT | Mod: PO,TXP

## 2017-09-21 PROCEDURE — 86832 HLA CLASS I HIGH DEFIN QUAL: CPT | Mod: PO,TXP

## 2017-10-16 LAB — HPRA INTERPRETATION: NORMAL

## 2017-10-21 LAB
CLASS I ANTIBODY COMMENTS - LUMINEX: NORMAL
CLASS II ANTIBODIES - LUMINEX: NORMAL
CLASS II ANTIBODY COMMENTS - LUMINEX: NORMAL
CPRA %: 0
SERUM COLLECTION DT - LUMINEX CLASS I: NORMAL
SERUM COLLECTION DT - LUMINEX CLASS II: NORMAL
SPCL1 TESTING DATE: NORMAL
SPCL2 TESTING DATE: NORMAL
SPCLU TESTING DATE: NORMAL

## 2018-02-08 ENCOUNTER — LAB VISIT (OUTPATIENT)
Dept: LAB | Facility: HOSPITAL | Age: 48
End: 2018-02-08
Payer: MEDICARE

## 2018-02-08 DIAGNOSIS — Z76.82 ORGAN TRANSPLANT CANDIDATE: ICD-10-CM

## 2018-02-08 DIAGNOSIS — Z76.82 ORGAN TRANSPLANT CANDIDATE: Primary | ICD-10-CM

## 2018-02-08 PROCEDURE — 86832 HLA CLASS I HIGH DEFIN QUAL: CPT | Mod: PO,TXP

## 2018-02-08 PROCEDURE — 86833 HLA CLASS II HIGH DEFIN QUAL: CPT | Mod: PO,TXP

## 2018-02-14 LAB — HPRA INTERPRETATION: NORMAL

## 2018-04-10 ENCOUNTER — TELEPHONE (OUTPATIENT)
Dept: TRANSPLANT | Facility: HOSPITAL | Age: 48
End: 2018-04-10

## 2018-04-10 ENCOUNTER — TELEPHONE (OUTPATIENT)
Dept: TRANSPLANT | Facility: CLINIC | Age: 48
End: 2018-04-10

## 2018-04-10 DIAGNOSIS — Z76.82 AWAITING ORGAN TRANSPLANT STATUS: Primary | ICD-10-CM

## 2018-04-10 NOTE — TELEPHONE ENCOUNTER
ON-CALL NOTE    [facility]# [id number]    Notified by Marcus Meléndez, , that Juan Antonio Gandhi on list for cadaveric kidney.  Spoke with patient and identified no acute medical issues in telephone assessment.  Virtual crossmatch negative. Current sample of blood is available for crossmatch.  Patient reports no sensitizing event since last blood sample for PRA received. Caregiver in place and no change to his insurance.     ON-CALL NOTE  Late entry 4/11/18 5am    Notified by Marcus Meléndez that crossmatch is negative. Patient released from donor offer due to organ offer went to higher ranking patient. Patient notified of test results and verbalized understanding.

## 2018-04-10 NOTE — TELEPHONE ENCOUNTER
"SW contacted pt regarding caregiver plan. Pt reports Christine Tejeda will act as caregiver if pt is transplanted. Pt provided permission for SW to confirm plan with caregiver. SW spoke with pt's girlfriend . Pt's girlfriend reports she will act as primary caregiver. SW provided Christine with caregiver education. Caregiver verbalized understanding.      Caregiver Information:   Name: Christine Tejeda   Age: 45  Number: [phone]  City/state: same pt   Availability: flexible/does not work   Transportation: drives/own car     Updated Adherence   According to dialysis unit medical record, in the last three months pt has, 0 AMAs, 0  No Shows and denies issues with transportation and labs. "He is a model pt".       Confirmed by dialysis unit-CHANTEL Brandon      "

## 2018-04-11 ENCOUNTER — LAB VISIT (OUTPATIENT)
Dept: LAB | Facility: HOSPITAL | Age: 48
End: 2018-04-11
Payer: MEDICARE

## 2018-04-11 DIAGNOSIS — Z76.82 ORGAN TRANSPLANT CANDIDATE: ICD-10-CM

## 2018-04-11 DIAGNOSIS — Z76.82 AWAITING ORGAN TRANSPLANT STATUS: ICD-10-CM

## 2018-04-11 LAB
B CELL RESULTS - XM ALLO: NEGATIVE
B CELL RESULTS - XM ALLO: NEGATIVE
B MCS AVERAGE - XM ALLO: -0.5
B MCS AVERAGE - XM ALLO: 0
DONOR MRN: NORMAL
DONOR MRN: NORMAL
FXMAL TESTING DATE: NORMAL
FXMAL TESTING DATE: NORMAL
HLA FLOW CROSSMATCH (ALLO) INTERPRETATION: NORMAL
HLA VIRTUAL CROSSMATCH INTERPRETATION: NORMAL
HLVXM TESTING DATE: NORMAL
SERUM COLLECTION DT - XM ALLO: NORMAL
SERUM COLLECTION DT - XM ALLO: NORMAL
T CELL RESULTS - XM ALLO: NEGATIVE
T CELL RESULTS - XM ALLO: NEGATIVE
T MCS AVERAGE - XM ALLO: -2
T MCS AVERAGE - XM ALLO: 4

## 2018-04-11 PROCEDURE — 86825 HLA X-MATH NON-CYTOTOXIC: CPT | Mod: PO,TXP

## 2018-04-11 PROCEDURE — 86825 HLA X-MATH NON-CYTOTOXIC: CPT | Mod: 91,PO,TXP

## 2018-04-11 PROCEDURE — 86826 HLA X-MATCH NONCYTOTOXC ADDL: CPT | Mod: 91,PO,TXP

## 2018-04-11 PROCEDURE — 86826 HLA X-MATCH NONCYTOTOXC ADDL: CPT | Mod: PO,TXP

## 2018-04-17 ENCOUNTER — HOSPITAL ENCOUNTER (OUTPATIENT)
Dept: RADIOLOGY | Facility: HOSPITAL | Age: 48
Discharge: HOME | End: 2018-04-17
Attending: NURSE PRACTITIONER
Payer: MEDICARE

## 2018-04-17 ENCOUNTER — HOSPITAL ENCOUNTER (OUTPATIENT)
Dept: CARDIOLOGY | Facility: CLINIC | Age: 48
Discharge: HOME | End: 2018-04-17
Attending: NURSE PRACTITIONER
Payer: MEDICARE

## 2018-04-17 ENCOUNTER — HOSPITAL ENCOUNTER (OUTPATIENT)
Dept: RADIOLOGY | Facility: HOSPITAL | Age: 48
Discharge: HOME | End: 2018-04-17
Attending: TRANSPLANT SURGERY
Payer: MEDICARE

## 2018-04-17 ENCOUNTER — OFFICE VISIT (OUTPATIENT)
Dept: TRANSPLANT | Facility: CLINIC | Age: 48
End: 2018-04-17
Payer: MEDICARE

## 2018-04-17 VITALS
OXYGEN SATURATION: 100 % | BODY MASS INDEX: 29.64 KG/M2 | TEMPERATURE: 98 F | HEART RATE: 64 BPM | RESPIRATION RATE: 16 BRPM | DIASTOLIC BLOOD PRESSURE: 69 MMHG | HEIGHT: 68 IN | WEIGHT: 195.56 LBS | SYSTOLIC BLOOD PRESSURE: 136 MMHG

## 2018-04-17 DIAGNOSIS — Z76.82 ORGAN TRANSPLANT CANDIDATE: ICD-10-CM

## 2018-04-17 DIAGNOSIS — N25.81 HYPERPARATHYROIDISM, SECONDARY RENAL: ICD-10-CM

## 2018-04-17 DIAGNOSIS — I12.0 BENIGN HYPERTENSION WITH ESRD (END-STAGE RENAL DISEASE): ICD-10-CM

## 2018-04-17 DIAGNOSIS — Z67.10 BLOOD TYPE A+: ICD-10-CM

## 2018-04-17 DIAGNOSIS — D63.1 ANEMIA IN CHRONIC KIDNEY DISEASE, ON CHRONIC DIALYSIS: ICD-10-CM

## 2018-04-17 DIAGNOSIS — Z99.2 ESRD (END STAGE RENAL DISEASE) ON DIALYSIS: Primary | ICD-10-CM

## 2018-04-17 DIAGNOSIS — N18.6 ESRD (END STAGE RENAL DISEASE) ON DIALYSIS: Primary | ICD-10-CM

## 2018-04-17 DIAGNOSIS — Z76.82 AWAITING ORGAN TRANSPLANT STATUS: ICD-10-CM

## 2018-04-17 DIAGNOSIS — Z76.82 AWAITING TRANSPLANTATION OF KIDNEY: ICD-10-CM

## 2018-04-17 DIAGNOSIS — I34.9 NONRHEUMATIC MITRAL VALVE DISORDER: ICD-10-CM

## 2018-04-17 DIAGNOSIS — N18.6 BENIGN HYPERTENSION WITH ESRD (END-STAGE RENAL DISEASE): ICD-10-CM

## 2018-04-17 DIAGNOSIS — Z99.2 ANEMIA IN CHRONIC KIDNEY DISEASE, ON CHRONIC DIALYSIS: ICD-10-CM

## 2018-04-17 DIAGNOSIS — N18.6 ANEMIA IN CHRONIC KIDNEY DISEASE, ON CHRONIC DIALYSIS: ICD-10-CM

## 2018-04-17 LAB
DIASTOLIC DYSFUNCTION: NO
ESTIMATED PA SYSTOLIC PRESSURE: 20.98
MITRAL VALVE REGURGITATION: NORMAL
RETIRED EF AND QEF - SEE NOTES: 60 (ref 55–65)
TRICUSPID VALVE REGURGITATION: NORMAL

## 2018-04-17 PROCEDURE — 76770 US EXAM ABDO BACK WALL COMP: CPT | Mod: TC,TXP

## 2018-04-17 PROCEDURE — 99213 OFFICE O/P EST LOW 20 MIN: CPT | Mod: PBBFAC,25,TXP | Performed by: INTERNAL MEDICINE

## 2018-04-17 PROCEDURE — 71046 X-RAY EXAM CHEST 2 VIEWS: CPT | Mod: TC,TXP

## 2018-04-17 PROCEDURE — 93978 VASCULAR STUDY: CPT | Mod: TC,TXP

## 2018-04-17 PROCEDURE — 99215 OFFICE O/P EST HI 40 MIN: CPT | Mod: S$PBB,TXP,, | Performed by: INTERNAL MEDICINE

## 2018-04-17 PROCEDURE — 93306 TTE W/DOPPLER COMPLETE: CPT | Mod: PBBFAC,TXP | Performed by: INTERNAL MEDICINE

## 2018-04-17 PROCEDURE — 76770 US EXAM ABDO BACK WALL COMP: CPT | Mod: 26,TXP,, | Performed by: RADIOLOGY

## 2018-04-17 PROCEDURE — 99999 PR PBB SHADOW E&M-EST. PATIENT-LVL III: CPT | Mod: PBBFAC,TXP,, | Performed by: INTERNAL MEDICINE

## 2018-04-17 PROCEDURE — 93978 VASCULAR STUDY: CPT | Mod: 26,TXP,, | Performed by: RADIOLOGY

## 2018-04-17 PROCEDURE — 71046 X-RAY EXAM CHEST 2 VIEWS: CPT | Mod: 26,TXP,, | Performed by: RADIOLOGY

## 2018-04-17 RX ORDER — OMEPRAZOLE 40 MG/1
40 CAPSULE, DELAYED RELEASE ORAL
COMMUNITY
Start: 2017-11-08 | End: 2018-11-08

## 2018-04-17 RX ORDER — CINACALCET 30 MG/1
30 TABLET, FILM COATED ORAL
COMMUNITY

## 2018-04-17 NOTE — LETTER
Date: 4/17/2018          Listed Patient      To: Dialysis Unit  and Charge RN From: [facility] Social Workers and      Kidney Transplant Nurse Coordinators    RE: Juan Antonio Gandhi, 1970, [id number]     At [facility], we conduct adherence checks as an important part of transplant care. Initial and listed patient assessments are not complete without adherence information.        Please complete the following information:     Current Dry Weight: ___________         Most Recent Pre-Treatment Weight: ___________ /date: _________                    Data from the last 3 months:  (data from last 3 months preferred):    Number of AMAs with dates, time, and reasons: ____________________________________________________    ______________________________________________________________________________________________    ______________________________________________________________________________________________    Number of No-Shows with dates and reasons: ______________________________________________________      ______________________________________________________________________________________________    Last intact PTH:  ___________/date: __________      Any concerns with Labs:  YES / NO      If yes, please explain:  ___________________________________________________________________________    ______________________________________________________________________________________________    Any concerns with Caregivers:  YES / NO    If yes, please explain:  ___________________________________________________________________________    ______________________________________________________________________________________________     Any concerns with Transportation:  YES / NO    If yes, please explain:   ___________________________________________________________________________    ______________________________________________________________________________________________    Any Psychiatric and/or Psychosocial concerns:  YES / NO     If yes, please explain: ___________________________________________________________________________    ______________________________________________________________________________________________      PLEASE RETURN TO: FAX: [phone]     Thank you for collaborating with us in the care of this patient.           [address]  ?  [place] [address]  ?  phone [phone]  ?  fax [phone]  ?  [email or web address]  Confidentiality notice: The accompanying facsimile is intended solely for the use of the recipient designated above. Document(s) transmitted herewith may contain information that is confidential and privileged. Delivery, distribution or dissemination of this communication other than to the intended recipient is strictly prohibited. If you have received this facsimile in error, please notify us immediately by telephone.

## 2018-04-17 NOTE — PROGRESS NOTES
Kidney Transplant Recipient Reevalulation    Referring Physician: Mima Clemente  Current Nephrologist: Mima Clemente  Waitlist Status: active  Dialysis Start Date: 3/5/2014    Subjective:     CC:  Annual reassessment of kidney transplant candidacy.    HPI:  Mr. Gandhi is a 47 y.o. year old Black or  male with ESRD presumed secondary to HTN.  He has been on the wait list for a kidney transplant at [facility] since 3/5/2014. Patient is currently on hemodialysis started on 3/5/14. Patient is dialyzing on MWF schedule.  Patient reports that he is tolerating dialysis well.. He has a LUE AV fistula - he had stent placed in his AVF in Nov 2017. Patient was last seen in listed RR clinic on 5/11/17. He denies any recent hospitalizations or ED visits.    He will walk, ride a bicycle outside weather permitting, do squats - for ~30 minutes three times a week. He lives in a 1 story house without any stairs. He does household chores - cooking, cleaning, grocery shopping, yardwork. With the activity that he does he denies ay chest pain, GOMES, or claudication.     He is accompanied to visit by his girlfriend.     Review of Systems   Constitutional: Denies fever/chills, fatigue, night sweats  EENT: +wears reading glasses; Denies vision problems, hearing problems, trouble swallowing.   Respiratory: +seasonal allergies - takes Singulair; Denies shortness of breath, dyspnea on exertion, orthopnea, wheezing, hemoptysis, denies known TB exposure or history of positive TB skin test  Cardiovascular: Denies chest pain, palpitations, history of MI, history of stroke, history of DVT  Gastrointestinal: Denies abdominal pain, nausea/vomiting/diarrhea/constipation. Denies history of GI bleeding or ulcers.   Genitourinary: +estimates residual UOP ~2 cups/day; Denies history of kidney stones, recurrent UTI's, history of urinary obstruction, hematuria, dysuria, urinary frequency, incontinence  Musculoskeletal: Denies trouble moving  "extremities, denies joint pain or swelling  Skin: Denies history of skin cancer, denies rash, ulcerations  Heme/onc: Denies any history of cancer, denies history of coagulopathies or bleeding disorders  Endocrine: Denies thyroid disease, unintentional weight loss/weight gain  Neurological: Denies tremors, seizures, dizziness, headaches, peripheral neuropathy  Psychiatric: Denies anxiety, depression. Denies hallucinations or delusions.    Potential Donors: no     Medications:  Current Outpatient Prescriptions   Medication Sig Dispense Refill    cinacalcet (SENSIPAR) 30 MG Tab Take 30 mg by mouth daily with breakfast.       fluticasone (FLONASE) 50 mcg/actuation nasal spray 1 spray by Each Nare route once daily.      lisinopril (PRINIVIL,ZESTRIL) 20 MG tablet Take 10 mg by mouth once daily.       montelukast (SINGULAIR) 5 MG chewable tablet Take 5 mg by mouth Daily.      omeprazole (PRILOSEC) 40 MG capsule Take 40 mg by mouth.      sevelamer carbonate (RENVELA) 800 mg Tab Take 2,400 mg by mouth 3 (three) times daily with meals.       No current facility-administered medications for this visit.          Objective:   body mass index is 29.64 kg/m².   /69 (BP Location: Right arm, Patient Position: Sitting, BP Method: Medium (Automatic))   Pulse 64   Temp 97.8 °F (36.6 °C) (Oral)   Resp 16   Ht 5' 8.11" (1.73 m)   Wt 88.7 kg (195 lb 8.8 oz)   SpO2 100%   BMI 29.64 kg/m²       Physical Exam   General: No acute distress, well groomed, alert and oriented x 3  HEENT: Normocephalic, atraumatic, PERRLA, sclera anicteric, conjunctiva/corneas clear, EOM's intact bilaterally, external inspection of ears and nose normal, moist mucous membranes, no oral ulcerations/lesions   Neck: Supple, symmetrical, trachea midline, no masses, no carotid bruits, no JVD, thyroid is normal without nodules or enlargement   Respiratory: Clear to auscultation bilaterally, respirations unlabored, no rales/rhonchi/wheezing "   Cardiovacular: Regular rate and rhythm, S1, S2 normal, no murmurs, no rubs or gallops   Gastrointestinal: Soft, non-tender, bowel sounds normal, no masses, no palpable organomegaly  Extremities: No clubbing or cyanosis of upper extremities bilaterally, no pedal edema bilaterally; +2 bilateral radial pulses  Skin: warm and dry; no rash on exposed skin  Lymph nodes: Cervical and supraclavicular nodes normal   Neurologic: No focal neurologic deficits.   Musculoskeletal: moves all extremities without difficulty, FROM, 5/5 strength, ambulates without an assistive device  Psychiatric: Normal mood and affect. Responds appropriately to questions.  Access: LUE AVF +thrill/bruit       Labs:  Lab Results   Component Value Date    WBC 7.02 11/04/2014    HGB 12.8 (L) 11/04/2014    HCT 41.4 11/04/2014     11/04/2014    K 4.2 11/04/2014    CL 98 11/04/2014    CO2 29 11/04/2014    BUN 37 (H) 11/04/2014    CREATININE 10.2 (H) 11/04/2014    EGFRNONAA 5.5 (A) 11/04/2014    CALCIUM 10.1 11/04/2014    ALBUMIN 4.1 11/04/2014    AST 11 11/04/2014    ALT 8 (L) 11/04/2014    .0 (H) 11/04/2014       No results found for: PREALBUMIN, BILIRUBINUA, GGT, AMYLASE, LIPASE, PROTEINUA, NITRITE, RBCUA, WBCUA    No results found for: HLAABCTYPE    Lab Results   Component Value Date    CPRA 0 02/07/2018    FA5UPOX Negative 02/07/2018    CIABCLM WEAK CW17, A34 09/20/2017    CIIAB DP1 02/07/2018    ABCMT DP5 02/07/2018       Labs were reviewed with the patient.    Pre-transplant Workup:   Reviewed with the patient.    Assessment:     1. ESRD (end stage renal disease) on dialysis - 03/05/2014    2. Blood type A+    3. Benign hypertension with ESRD (end-stage renal disease)    4. Awaiting transplantation of kidney    5. Anemia in chronic kidney disease, on chronic dialysis    6. Hyperparathyroidism, secondary renal    7. Organ transplant candidate    8. Awaiting organ transplant status        Plan:     Transplant Candidacy:   Mr. Gandhi is a  suitable kidney transplant candidate.  He remains in overall stable health, and will remain active on the transplant list.    Exercise: reminded Juan Antonio of the importance of regular exercise for weight management, blood sugar and blood pressure management.  I also explained exercise has been shown to improve cardiovascular health, energy level, and sleep hygiene.  Lastly, I advised him that cardiovascular complications are leading cause of death and regular exercise can help lower this risk.    Encouraged him to seek potential living donors and have then contact the living donor coordinator.       Linda Hart MD       Follow-up:   In addition to the tests noted in the plan, Mr. Gandhi will continue to have reevaluation as per the standing pre-kidney transplant protocol:  1. Monthly blood for PRA  2. Annual return to clinic, except HIV positive, > 65 years of age, or pancreas transplant candidates who will be scheduled to see transplant every 6 months while in pre-transplant phase  3. Annual re-testing: CXR, EKG, yearly mammograms for women over 40 and PSA for males over 40, cardiology follow-up as recommended by initial cardiology pre-transplant evaluation  4. Renal ultrasound every 2 years  5. Baseline colonoscopy after age 50 and repeated as recommended    UNOS Patient Status  Functional Status: 70% - Cares for self: unable to carry on normal activity or active work  Physical Capacity: No Limitations

## 2018-04-17 NOTE — LETTER
April 19, 2018        Mima Clemente  [address]  [place] [address]  Phone: [phone]  Fax: [phone]             [facility]  [address]  [facility] [address]  Phone: [phone]   Patient: Juan Antonio Gandhi   MR Number: [id number]   YOB: 1970   Date of Visit: 4/17/2018       Dear Dr. Mima Clemente    Thank you for referring Juan Antonio Gandhi to me for evaluation. Attached you will find relevant portions of my assessment and plan of care.    If you have questions, please do not hesitate to call me. I look forward to following Juan Antonio Gandhi along with you.    Sincerely,    Linda Hart MD    Enclosure    If you would like to receive this communication electronically, please contact [email or web address] or [phone] to request [facility] access.    [facility] is a tool which provides read-only access to select patient information with whom you have a relationship. Its easy to use and provides real time access to review your patients record including encounter summaries, notes, results, and demographic information.    If you feel you have received this communication in error or would no longer like to receive these types of communications, please e-mail [email or web address]

## 2018-04-17 NOTE — PROGRESS NOTES
Transplant Recipient Adult Psychosocial Assessment  **Pt presented with girlfriend and primary caregiver, Jeremy Gandhi  [address]  [place] [address]    Telephone Information:   Mobile [phone]   Mobile Not on file.   Home  [phone] (home)  Work  There is no work phone number on file.  E-mail  [email or web address]    Sex: male  YOB: 1970  Age: 47 y.o.    Encounter Date: 4/17/2018  [place] Citizen: yes  Primary Language: English   Needed: no    Emergency Contact:  Name: Ananya Gandhi  Relationship: mother  Address: [address] [place] [address] [place] [address]  Phone Numbers:  [phone] (home), [phone] (mobile)    Family/Social Support:   Number of dependents/: Pt reports no dependents.  Marital history: Pt reports 2 marriages with both marriage ending in divorce.   Other family dynamics: . Pt resides with mother. Pt reports 1 adult son: Juan Antonio Dumont; 4 sisters: Jemma, Casey, Jessika, and Roma; mom: Ananya Joshi. Pt identifies all family members as supportive.    Household Composition:  Name: Juan Antonio Gandhi  Age: 46  Relationship: patient  Does person drive? yes    Name: Ananya Gandhi  Age: 74  Relationship: mother  Does person drive? yes    Do you and your caregivers have access to reliable transportation? yes  PRIMARY CAREGIVER: Christine Blanc will be primary caregiver, phone number [phone].  Caregiver states understanding all aspects of caregiver role/commitment.  Caregiver states is able/willing/committed to being caregiver to the fullest extent necessary.  provided in-depth information to patient and caregiver regarding pre- and post-transplant caregiver role.  Patient and caregiver verbalizes understanding of the education provided today.   strongly encourages patient and caregiver to have concrete plan regarding post-transplant care giving, including back-up caregiver(s) to ensure care giving needs are met as needed.   "Patient and caregiver verbalizes understanding of caregiver responsibilities.   remains available. Patient and caregiver agree to contact  in a timely manner if concerns arise.  Able to take time off work without financial concerns: yes.     Pt presents with Christine Blanc, who is pt's priamry caregiver, and reports they have been together one year. Pt previously listed Desiree Jimenez as caregiver 6 months ago and also reported she was his girlfriend. SW inquired about Desiree and pt states "are you trying to get me in trouble". SW reports no, but is inquiring about stable caregiver plan. Pt's caregiver states "I will take care of him".    Additional Significant Others who will Assist with Transplant:  Name: Ananya Gandhi  Age: 74  City: [place] State: [place]  Relationship: mother  Does person drive? yes Pt reports that his mother does not drive outside of [place], [place]. SW inquired if pt thought his mother was a reliable back up caregiver and he reports no. Pt provided his niece's information as she will serve as backup.    Name: Cindi Scherer  Age: 32  City: [place] State: [place]  Relationship: neice  Does person drive? yes       Living Will: no Education and information provided to pt.  Healthcare Power of : no Education and information provided to pt.  Advance Directives on file: <<no information> per medical record.  Verbally reviewed LW/HCPA information.   provided patient with copy of LW/HCPA documents and provided education on completion of forms    Living Donors: No. Education and resource information given to patient.    Highest Education Level: High School (9-12) or GED  Reading Ability: college  Reports difficulty with: Pt reports no difficulites.  Learns Best By:  Pt reports pt learns best by a combination of verbal and written instructions.     Status: no  VA Benefits: no     Working for Income: No  If no, reason not working: " Disability  Spouse/Significant Other Employment: s/o reports she will be able to take time off of work with no concerns.    Disabled: yes: date disability began: 2014, due to: ESRD.    Monthly Income:  SS Disability: $911  Food [place]: $30  Able to afford all costs now and if transplanted, including medications: yes. Pt reports that he can also receive assistance from family members. Pt reports strong motivation to return to work and SW provided vocational rehabilitation resources   Patient verbalizes understanding of personal responsibilities related to transplant costs and the importance of having a financial plan to ensure that patients transplant costs are fully covered.   provided fundraising information/education.  Patient verbalizes understanding.   remains available.    Insurance:   Payor/Plan Subscr  Sex Relation Sub. Ins. ID Effective Group Num   1. MEDICARE - ME* LIVIER JAMES 1970 Male  [id number]                                    [address]   2. GENERIC OUT O* LIVIER JAMES 1970 Male  [id number]* 14                                    [address], [place] [address]     Primary insurance:Medicare ABD  Secondary Insurance (for UNOS reporting): Public Insurance - Medicaid. Pt reports also having Cigna Part D.  Patient verbalizes clear understanding that patient may experience difficulty obtaining and/or be denied insurance coverage post-surgery. This includes and is not limited to disability insurance, life insurance, health insurance, burial insurance, long term care insurance, and other insurances.  Patient also reports understanding that future health concerns related to or unrelated to transplantation may not be covered by patient's insurance.  Resources and information provided and reviewed.  Pt verbalized understanding of Medicare and Medicaid coverage and difficulty of obtaining other insurances post transplant.    Patient provides verbal  permission to release any necessary information to outside resources for patient care and discharge planning.  Resources and information provided are reviewed.      Dialysis History:  Pt reports complete adherence to all medical advice from dialysis unit.    Infusion Service: patient utilizing? no  Home Health: patient utilizing? no  DME: no  Pulmonary/Cardiac Rehab: Pt denies.   ADLS:  Pt reports no difficulties with driving, walking, bathing, cooking, housekeeping, eating, shopping, and taking medication.    Adherence:   Pt reports good adherence with medications, dialysis, and health regimen.  Adherence education and counseling provided.     Per History Section:  Past Medical History:   Diagnosis Date    Allergy     Benign hypertension with ESRD (end-stage renal disease) 11/4/2014    Blood type A+ 11/4/2014    ESRD (end stage renal disease) on dialysis - 03/05/2014 11/4/2014    Hyperparathyroidism, secondary renal 11/4/2014    Organ transplant candidate 11/4/2014     Social History   Substance Use Topics    Smoking status: Never Smoker    Smokeless tobacco: Never Used    Alcohol use No     History   Drug Use No     History   Sexual Activity    Sexual activity: Not on file       Per Today's Psychosocial:  Tobacco: none, patient denies any use.  Alcohol: none, patient denies any use.  Illicit Drugs/Non-prescribed Medications: none, patient denies any use.    Patient states clear understanding of the potential impact of substance use as it relates to transplant candidacy and is aware of possible random substance screening.  Substance abstinence/cessation counseling, education and resources provided and reviewed.     Arrests: Pt reports being arrested in 2005 for possession of an illegal substance.   DWI/Treatment/Rehab: patient denies    Psychiatric History:    Mental Health: Pt reports no history of or current mental health issues or concerns.   Psychiatrist/Counselor: Pt denies seeing a mental  professional.  Medications:  Pt denies taking medications for mental health reasons.  Suicide/Homicide Issues: Pt denies any history of or current suicidal or homicidal ideations.      Knowledge: Patient states having clear understanding and realistic expectations regarding the potential risks and potential benefits of organ transplantation and organ donation and agrees to discuss with health care team members and support system members, as well as to utilize available resources and express questions and/or concerns in order to further facilitate the pt informed decision-making.  Resources and information provided and reviewed. Pt reports having a good understanding of the transplant process from information obtained through appointments with the transplant team.    Patient is aware of [facility]'s affiliation and/or partnership with agencies in home health care, LTAC, SNF, [facility], and other hospitals and clinics.    Understanding: Patient reports having a clear understanding of the many lifetime commitments involved with being a transplant recipient, including costs, compliance, medications, lab work, procedures, appointments, concrete and financial planning, preparedness, timely and appropriate communication of concerns, abstinence (ETOH, tobacco, illicit non-prescribed drugs), adherence to all health care team recommendations, support system and caregiver involvement, appropriate and timely resource utilization and follow-through, mental health counseling as needed/recommended, and patient and caregiver responsibilities.  Social Service Handbook, resources and detailed educational information provided and reviewed.  Educational information provided.    Patient also reports current and expected compliance with health care regime, and patient states having a clear understanding of the importance of compliance.  Patient reports a clear understanding that risks and benefits may be involved with organ transplantation and  with organ donation.  Patient also reports clear understanding that psychosocial risk factors may affect patient, and include but are not limited to feelings of depression, generalized anxiety, anxiety regarding dependence on others, post traumatic stress disorder, feelings of guilt and other emotional and/or mental concerns, and/or exacerbation of existing mental health concerns.  Detailed resources provided and discussed.  Patient agrees to access appropriate resources in a timely manner as needed and/or as recommended, and to communicate concerns appropriately.  Patient also reports a clear understanding of treatment options available.  Pt and pt's caregiver verbalized understanding of the expectations of the transplant process. SW educated pt on mental health concerns as it pertains to the transplant process. Pt reports being open to seeing psych for talk therapy if necessary and agrees to contact the transplant team if the process becomes too overwhelming. SW provided education and emotional support regarding the transplant process. SW remains available.    Patient and caregiver received education in a group setting.   reviewed education, provided additional information, and answered questions.    Feelings or Concerns: Pt and pt's caregiver did not express any concerns at this time.     Coping: Pt reports coping well with the transplant process at this time and reports exercising as a way to cope. Pt reports [facility]ational home as [facility] in [place], [place] with Maribell Morales presiding.     Goals: Pt reports going back to work, being successful, and regaining a full quality of life as goals for after transplant.  Patient referred to Vocational Rehabilitation.    Interview Behavior: Patient presents as alert and oriented x 4, pleasant, good eye contact, well groomed, recall good, concentration/judgement good, average intelligence, calm, communicative, cooperative and asking and answering questions  appropriately.  Pt presents with s/o Desiree with permission.  Pt was highly engaged and reports high motivation for transplant and returning to work post transplant.          Transplant Social Work - Candidacy  Assessment/Plan:     Psychosocial Suitability: Patient presents as a suitable candidate for kidney transplant at this time. Based on psychosocial risk factors, patient presents as low risk, due to aduquate insurnce, suitable caregiver plan, and strong dialysis adherence..    Recommendations/Additional Comments: SW recommends that pt conduct fundraising to assist pt with pay for cost of medications, food, gas, and other transplant related needs. SW recommends that pt remain aware of potential mental health concerns and contact the team if any concerns arise. SW recommends that pt remain abstinent from tobacco, ETOH, and drug use. SW supports pt's continued dialysis adherence. SW remains available to answer any questions or concerns that arise as the pt moves through the transplant process.       Melissa Antonio LMSW

## 2018-04-18 NOTE — PROGRESS NOTES
Patient's chart reviewed today. Patient due for follow-up in April 2019. Appointments will be scheduled per protocol. HLA sample current, in lab, and being received on a regular basis.

## 2018-05-10 ENCOUNTER — LAB VISIT (OUTPATIENT)
Dept: LAB | Facility: HOSPITAL | Age: 48
End: 2018-05-10
Payer: MEDICARE

## 2018-05-10 DIAGNOSIS — Z76.82 ORGAN TRANSPLANT CANDIDATE: ICD-10-CM

## 2018-05-10 PROCEDURE — 86832 HLA CLASS I HIGH DEFIN QUAL: CPT | Mod: PO,TXP

## 2018-05-10 PROCEDURE — 86833 HLA CLASS II HIGH DEFIN QUAL: CPT | Mod: PO,TXP

## 2018-05-25 LAB — HPRA INTERPRETATION: NORMAL
